# Patient Record
Sex: FEMALE | Race: WHITE | NOT HISPANIC OR LATINO | Employment: OTHER | URBAN - METROPOLITAN AREA
[De-identification: names, ages, dates, MRNs, and addresses within clinical notes are randomized per-mention and may not be internally consistent; named-entity substitution may affect disease eponyms.]

---

## 2023-03-16 ENCOUNTER — TELEPHONE (OUTPATIENT)
Dept: HEMATOLOGY/ONCOLOGY | Facility: CLINIC | Age: 68
End: 2023-03-16
Payer: MEDICARE

## 2023-03-24 ENCOUNTER — TELEPHONE (OUTPATIENT)
Dept: HEMATOLOGY/ONCOLOGY | Facility: CLINIC | Age: 68
End: 2023-03-24
Payer: MEDICARE

## 2023-03-24 NOTE — NURSING
3/30/-Tytyrizz Dr. Kaur's note and scanned into Media        2nd request sent for Dr. Kaur's office note.

## 2023-03-30 ENCOUNTER — OFFICE VISIT (OUTPATIENT)
Dept: HEMATOLOGY/ONCOLOGY | Facility: CLINIC | Age: 68
End: 2023-03-30
Payer: MEDICARE

## 2023-03-30 VITALS
HEART RATE: 80 BPM | TEMPERATURE: 98 F | BODY MASS INDEX: 22.36 KG/M2 | HEIGHT: 69 IN | RESPIRATION RATE: 18 BRPM | WEIGHT: 151 LBS | OXYGEN SATURATION: 97 % | SYSTOLIC BLOOD PRESSURE: 130 MMHG | DIASTOLIC BLOOD PRESSURE: 79 MMHG

## 2023-03-30 DIAGNOSIS — E04.1 THYROID NODULE: ICD-10-CM

## 2023-03-30 DIAGNOSIS — E04.1 THYROID NODULE: Primary | ICD-10-CM

## 2023-03-30 DIAGNOSIS — Z85.038 HISTORY OF COLON CANCER: ICD-10-CM

## 2023-03-30 PROCEDURE — 1101F PT FALLS ASSESS-DOCD LE1/YR: CPT | Mod: CPTII,S$GLB,, | Performed by: OTOLARYNGOLOGY

## 2023-03-30 PROCEDURE — 3008F PR BODY MASS INDEX (BMI) DOCUMENTED: ICD-10-PCS | Mod: CPTII,S$GLB,, | Performed by: OTOLARYNGOLOGY

## 2023-03-30 PROCEDURE — 1159F MED LIST DOCD IN RCRD: CPT | Mod: CPTII,S$GLB,, | Performed by: OTOLARYNGOLOGY

## 2023-03-30 PROCEDURE — 3078F PR MOST RECENT DIASTOLIC BLOOD PRESSURE < 80 MM HG: ICD-10-PCS | Mod: CPTII,S$GLB,, | Performed by: OTOLARYNGOLOGY

## 2023-03-30 PROCEDURE — 1126F AMNT PAIN NOTED NONE PRSNT: CPT | Mod: CPTII,S$GLB,, | Performed by: OTOLARYNGOLOGY

## 2023-03-30 PROCEDURE — 3075F SYST BP GE 130 - 139MM HG: CPT | Mod: CPTII,S$GLB,, | Performed by: OTOLARYNGOLOGY

## 2023-03-30 PROCEDURE — 99205 OFFICE O/P NEW HI 60 MIN: CPT | Mod: 25,S$GLB,, | Performed by: OTOLARYNGOLOGY

## 2023-03-30 PROCEDURE — 1160F RVW MEDS BY RX/DR IN RCRD: CPT | Mod: CPTII,S$GLB,, | Performed by: OTOLARYNGOLOGY

## 2023-03-30 PROCEDURE — 31575 PR LARYNGOSCOPY, FLEXIBLE; DIAGNOSTIC: ICD-10-PCS | Mod: S$GLB,,, | Performed by: OTOLARYNGOLOGY

## 2023-03-30 PROCEDURE — 3288F FALL RISK ASSESSMENT DOCD: CPT | Mod: CPTII,S$GLB,, | Performed by: OTOLARYNGOLOGY

## 2023-03-30 PROCEDURE — 3051F PR MOST RECENT HEMOGLOBIN A1C LEVEL 7.0 - < 8.0%: ICD-10-PCS | Mod: CPTII,S$GLB,, | Performed by: OTOLARYNGOLOGY

## 2023-03-30 PROCEDURE — 3008F BODY MASS INDEX DOCD: CPT | Mod: CPTII,S$GLB,, | Performed by: OTOLARYNGOLOGY

## 2023-03-30 PROCEDURE — 3075F PR MOST RECENT SYSTOLIC BLOOD PRESS GE 130-139MM HG: ICD-10-PCS | Mod: CPTII,S$GLB,, | Performed by: OTOLARYNGOLOGY

## 2023-03-30 PROCEDURE — 3051F HG A1C>EQUAL 7.0%<8.0%: CPT | Mod: CPTII,S$GLB,, | Performed by: OTOLARYNGOLOGY

## 2023-03-30 PROCEDURE — 1160F PR REVIEW ALL MEDS BY PRESCRIBER/CLIN PHARMACIST DOCUMENTED: ICD-10-PCS | Mod: CPTII,S$GLB,, | Performed by: OTOLARYNGOLOGY

## 2023-03-30 PROCEDURE — 31575 DIAGNOSTIC LARYNGOSCOPY: CPT | Mod: S$GLB,,, | Performed by: OTOLARYNGOLOGY

## 2023-03-30 PROCEDURE — 4010F ACE/ARB THERAPY RXD/TAKEN: CPT | Mod: CPTII,S$GLB,, | Performed by: OTOLARYNGOLOGY

## 2023-03-30 PROCEDURE — 99999 PR PBB SHADOW E&M-EST. PATIENT-LVL V: CPT | Mod: PBBFAC,,, | Performed by: OTOLARYNGOLOGY

## 2023-03-30 PROCEDURE — 1101F PR PT FALLS ASSESS DOC 0-1 FALLS W/OUT INJ PAST YR: ICD-10-PCS | Mod: CPTII,S$GLB,, | Performed by: OTOLARYNGOLOGY

## 2023-03-30 PROCEDURE — 4010F PR ACE/ARB THEARPY RXD/TAKEN: ICD-10-PCS | Mod: CPTII,S$GLB,, | Performed by: OTOLARYNGOLOGY

## 2023-03-30 PROCEDURE — 1126F PR PAIN SEVERITY QUANTIFIED, NO PAIN PRESENT: ICD-10-PCS | Mod: CPTII,S$GLB,, | Performed by: OTOLARYNGOLOGY

## 2023-03-30 PROCEDURE — 1159F PR MEDICATION LIST DOCUMENTED IN MEDICAL RECORD: ICD-10-PCS | Mod: CPTII,S$GLB,, | Performed by: OTOLARYNGOLOGY

## 2023-03-30 PROCEDURE — 99205 PR OFFICE/OUTPT VISIT, NEW, LEVL V, 60-74 MIN: ICD-10-PCS | Mod: 25,S$GLB,, | Performed by: OTOLARYNGOLOGY

## 2023-03-30 PROCEDURE — 99999 PR PBB SHADOW E&M-EST. PATIENT-LVL V: ICD-10-PCS | Mod: PBBFAC,,, | Performed by: OTOLARYNGOLOGY

## 2023-03-30 PROCEDURE — 3288F PR FALLS RISK ASSESSMENT DOCUMENTED: ICD-10-PCS | Mod: CPTII,S$GLB,, | Performed by: OTOLARYNGOLOGY

## 2023-03-30 PROCEDURE — 3078F DIAST BP <80 MM HG: CPT | Mod: CPTII,S$GLB,, | Performed by: OTOLARYNGOLOGY

## 2023-03-30 NOTE — PROGRESS NOTES
Date of Encounter: 3/30/2023  Provider: Carla Woo MD  Referring MD: Melody Kaur MD  PCP: Ana Schwarz MD  Med Onc: Melody Kaur MD  [place]-rectal surgeon: Kaitlynn Syed MD    CC: thyroid nodule (FLUS); hx of colon cancer    HPI:    Patient is a 67-year-old female with a history of colon cancer who was referred for evaluation and treatment of a thyroid nodule, FNA consistent with a follicular lesion of undetermined significance.  Patient has a hx of hypothyroidism and has taken synthroid for about the last  10 years  The thyroid mass was an incidental finding.  She denies a history of ionizing radiation to the head neck, family history of thyroid, dysphonia, dyspnea and dysphagia.      ROS: see HPI  Constitutional: Negative for activity change and appetite change, weight loss.   Eyes: Negative for discharge, visual changes.   Respiratory: Negative for difficulty breathing and wheezing   Cardiovascular: Negative for chest pain.   Gastrointestinal: Negative for abdominal distention and abdominal pain.   Endocrine: Negative for cold intolerance and heat intolerance.   Genitourinary: Negative for dysuria.   Musculoskeletal: Negative for gait problem, muscle pain and joint swelling.   Skin: Negative for color change and pallor; negative for skin lesions.   Neurological: Negative for syncope and weakness; no numbness face.   Psychiatric/Behavioral: Negative for agitation and confusion; negative for depression.    Physical Exam:      Constitutional  General Appearance: well nourished, well-developed, alert, oriented, in no acute distress  Communication: ability, understanding, normal  Head and Face  Inspection: normocephalic, atraumatic, no scars, lesions or masses   Palpation: no stepoffs, sinus tenderness or masses  Parotid glands: no masses, stones, swelling or tenderness  Eyes  Ocular Motility / Alignment: normal alignment, motility, no proptosis, enophthalmus or nystagmus  Conjunctiva: not  injected  Eyelids: no hooding, lag, entropion, or ectropion  Ears  Hearing: speech reception thresholds grossly normal  External Ears: no auricle lesions, non-tender, mobile to palpation  Otoscopy:  Right Ear: no tympanic membrane lesions, perforations, or effusion, normal EAC  Left Ear: no tympanic membrane lesions, perforations, or effusion, normal EAC  Nose  External Nose: no lesions, tenderness, trauma or deformity  Intranasal Exam: no edema, erythema, discharge, mass or obstruction  Oral Cavity / Oropharynx  Lips: upper and lower lips pink and moist  Teeth: good dentition  Gingiva: healthy  Oral Mucosa: moist, no mucosal lesions  Floor of Mouth: normal, no lesions, salivary ducts patent  Tongue: moist, normal mobility, no lesions  Palate: soft and hard palates without lesions or ulcers  Oropharynx: tonsils and walls without erythema, exudate, base of tongue soft to palpation  Nasopharynx, Hypopharynx, and Larynx  Indirect: could not perform exam due to intolerance by patient  Neck  Inspection and Palpation: no erythema, induration, emphysema, tenderness or masses  Larynx and Trachea: normal position; normal crepitus  Thyroid: large left inferior thyroid nodule  Submandibular Glands: no masses or tenderness  Lymphatic:  Anterior, Posterior, Submandibular, Submental, Supraclavicular: no lymphadenopathy present  Chest / Respiratory  Chest: no stridor or retractions, normal effort and expansion  Cardiovascular:  Pulses: 2+ carotid pulses bilaterally  Auscultation: deferred  Neurological  Cranial Nerves: grossly intact  General: no focal deficits  Psychiatric  Orientation: oriented to time, place and person  Mood and Affect: no depression, anxiety or agitation  Extremities  Inspection: moves all extremities well  Donor site  Chest, Back, Abdomen, Arms, Legs: N/A    PROCEDURES:   -------------------- LARYNGOSCOPY / NASOPHARYNGOSCOPY -------------------------     Pre-op DX: thyroid nodule  Post-op DX:  same  Anesthesia: Topical Neosynephrine / Tetracaine  Indications: to look at larynx  Adverse Events: None        Procedure in Detail:     Flexible endoscopy with video was performed through the nasal passages. The nasal cavity, nasopharynx, oropharynx, hypopharynx and larynx were adequately visualized. The true vocal cords and arytenoids were examined during phonation and repose.        Operative Findings:     Nasal Cavity: Within normal limits  Nasopharynx: Within normal limits  Tongue Base: Within normal limits  Pharyngeal Walls: Within normal limits  Epiglottis / Aryepiglottic Folds: Within normal limits  Pyriform Sinus: Within normal limits  Vocal Cords: Within normal limits  Arytenoids: Within normal limits        Test results:  Labs:  Path:  LEFT THYROID, FINE NEEDLE ASPIRATION:     --FOLLICULAR LESION OF UNDETERMINED SIGNIFICANCE (BETHESDA CATEGORY III    OF VI).     Comment:     Dr. Isaiah Garcia has reviewed this case in intradepartmental    consultation and concurs with the above interpretation.       US: 12/2022  FINDINGS:  The right lobe of the thyroid measures 4.8 x 0.8 x 1.2 cm.  The left lobe measures 2.9 x 5.5 x 3.3 cm.  The thyroid isthmus measures 2 mm in thickness.  There is a well-circumscribed hyperechoic 3.0 x 3.7 x 3.2 cm nodule in the left lobe.  There are no concerning artifacts.  There is normal vascularity.     Impression:   Left-sided thyroid nodule as above which meets criteria for percutaneous sampling.  TI-RADS category 3.     Assessment:   3.7 cm left sided thyroid nodule; FNA follicular lesion of unknown significance  History of colon cancer    Plan:   Due to the fact that the patient is over 55 years of age, the nodule is 3.7 cm in the biopsy is a follicular lesion of unknown significance, I recommend a left thyroid lobectomy with possible total thyroidectomy pending pathology results.  Risks, benefits and alternatives were discussed with the patient, questions were answered and  the consent was signed.  Risks include but are not limited to bleeding, scarring, infection, hematoma, seroma, numbness of the skin of the neck for 6-12 months, unsatisfactory cosmetic result, injury to 1 or both of the vocal cord nerves with temporary or permanent hoarseness, dysphagia need for tracheotomy, temporary or permanent hypoparathyroidism with need for calcium and vitamin-D supplementation and need for further treatment

## 2023-04-25 PROBLEM — Z85.038 HISTORY OF COLON CANCER: Status: ACTIVE | Noted: 2023-04-25

## 2023-04-25 PROBLEM — E04.1 THYROID NODULE: Status: ACTIVE | Noted: 2023-04-25

## 2023-04-26 DIAGNOSIS — G89.18 POST-OP PAIN: Primary | ICD-10-CM

## 2023-04-26 RX ORDER — HYDROCODONE BITARTRATE AND ACETAMINOPHEN 10; 325 MG/1; MG/1
1 TABLET ORAL EVERY 6 HOURS PRN
Qty: 28 TABLET | Refills: 0 | Status: SHIPPED | OUTPATIENT
Start: 2023-04-26 | End: 2023-05-02

## 2023-05-01 NOTE — PROGRESS NOTES
"Date of Encounter: 3/30/2023  Provider: Carla Woo MD  Referring MD: Melody Kaur MD  PCP: Ana Schwarz MD  Med Onc: Melody Kaur MD  [place]-rectal surgeon: Kaitlynn Syed MD    CC: thyroid nodule (FLUS); hx of colon cancer    HPI:    Patient is a 67-year-old female with a history of colon cancer who was referred for evaluation and treatment of a thyroid nodule, FNA consistent with a follicular lesion of undetermined significance.  Patient has a hx of hypothyroidism and has taken synthroid for about the last  10 years  The thyroid mass was an incidental finding.  She denies a history of ionizing radiation to the head neck, family history of thyroid, dysphonia, dyspnea and dysphagia.    5/2/2023  Patient is here today status post left thyroid lobectomy.  Tolerating a regular diet  Sore throat getting gradually better.    ROS: see HPI  Constitutional: Negative for activity change and appetite change, weight loss.   Eyes: Negative for discharge, visual changes.   Respiratory: Negative for difficulty breathing and wheezing   Cardiovascular: Negative for chest pain.   Gastrointestinal: Negative for abdominal distention and abdominal pain.   Endocrine: Negative for cold intolerance and heat intolerance.   Genitourinary: Negative for dysuria.   Musculoskeletal: Negative for gait problem, muscle pain and joint swelling.   Skin: Negative for color change and pallor; negative for skin lesions.   Neurological: Negative for syncope and weakness; no numbness face.   Psychiatric/Behavioral: Negative for agitation and confusion; negative for depression.    Physical Exam:      Constitutional  General Appearance: well nourished, well-developed, alert, oriented, in no acute distress  Communication: ability, understanding, normal; voice normal; no stridor  Neck  Inspection and Palpation: incision intact; no hematoma/seroma      FINAL DIAGNOSIS:     1. SPECIMEN SUBMITTED AS "LYMPH NODE," RIGHT LEVEL 6, EXCISION:       " --BENIGN FIBROADIPOSE TISSUE.       --NO LYMPH NODE IDENTIFIED.     2. THYROID GLAND, LEFT LOBE, LOBECTOMY:   --ENCAPSULATED ANGIOINVASIVE FOLLICULAR THYROID CARCINOMA (4.7  CENTIMETERS).       --MARGINS ARE NEGATIVE FOR MALIGNANCY.       --PATCHY MINIMAL-TO-MILD LYMPHOCYTIC THYROIDITIS.   --ONE INCIDENTAL PERITHYROIDAL LYMPH NODE WITH NO MORPHOLOGIC EVIDENCE  OF MALIGNANCY (0/1).     Path: FNA  LEFT THYROID, FINE NEEDLE ASPIRATION:     --FOLLICULAR LESION OF UNDETERMINED SIGNIFICANCE (BETHESDA CATEGORY III    OF VI).     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 Will also scuss at Tumor Board Thursday  Refer to Endocrinology  Risks include but are not limited to bleeding, scarring, infection, hematoma, seroma, numbness of the skin of the neck for 6-12 months, unsatisfactory cosmetic result, injury to right vocal cord nerve with temporary or permanent hoarseness, dysphagia, temporary or permanent hypoparathyroidism with need for calcium and vitamin-D supplementation and need for further treatment

## 2023-05-02 ENCOUNTER — OFFICE VISIT (OUTPATIENT)
Dept: HEMATOLOGY/ONCOLOGY | Facility: CLINIC | Age: 68
End: 2023-05-02
Payer: MEDICARE

## 2023-05-02 VITALS
DIASTOLIC BLOOD PRESSURE: 76 MMHG | BODY MASS INDEX: 21.74 KG/M2 | HEART RATE: 75 BPM | RESPIRATION RATE: 18 BRPM | TEMPERATURE: 98 F | WEIGHT: 146.81 LBS | HEIGHT: 69 IN | OXYGEN SATURATION: 95 % | SYSTOLIC BLOOD PRESSURE: 135 MMHG

## 2023-05-02 DIAGNOSIS — C73 THYROID CANCER: ICD-10-CM

## 2023-05-02 PROCEDURE — 3008F PR BODY MASS INDEX (BMI) DOCUMENTED: ICD-10-PCS | Mod: CPTII,S$GLB,, | Performed by: OTOLARYNGOLOGY

## 2023-05-02 PROCEDURE — 1126F AMNT PAIN NOTED NONE PRSNT: CPT | Mod: CPTII,S$GLB,, | Performed by: OTOLARYNGOLOGY

## 2023-05-02 PROCEDURE — 99024 PR POST-OP FOLLOW-UP VISIT: ICD-10-PCS | Mod: S$GLB,,, | Performed by: OTOLARYNGOLOGY

## 2023-05-02 PROCEDURE — 3075F SYST BP GE 130 - 139MM HG: CPT | Mod: CPTII,S$GLB,, | Performed by: OTOLARYNGOLOGY

## 2023-05-02 PROCEDURE — 1126F PR PAIN SEVERITY QUANTIFIED, NO PAIN PRESENT: ICD-10-PCS | Mod: CPTII,S$GLB,, | Performed by: OTOLARYNGOLOGY

## 2023-05-02 PROCEDURE — 1159F MED LIST DOCD IN RCRD: CPT | Mod: CPTII,S$GLB,, | Performed by: OTOLARYNGOLOGY

## 2023-05-02 PROCEDURE — 3288F FALL RISK ASSESSMENT DOCD: CPT | Mod: CPTII,S$GLB,, | Performed by: OTOLARYNGOLOGY

## 2023-05-02 PROCEDURE — 3075F PR MOST RECENT SYSTOLIC BLOOD PRESS GE 130-139MM HG: ICD-10-PCS | Mod: CPTII,S$GLB,, | Performed by: OTOLARYNGOLOGY

## 2023-05-02 PROCEDURE — 3008F BODY MASS INDEX DOCD: CPT | Mod: CPTII,S$GLB,, | Performed by: OTOLARYNGOLOGY

## 2023-05-02 PROCEDURE — 99024 POSTOP FOLLOW-UP VISIT: CPT | Mod: S$GLB,,, | Performed by: OTOLARYNGOLOGY

## 2023-05-02 PROCEDURE — 4010F ACE/ARB THERAPY RXD/TAKEN: CPT | Mod: CPTII,S$GLB,, | Performed by: OTOLARYNGOLOGY

## 2023-05-02 PROCEDURE — 1160F RVW MEDS BY RX/DR IN RCRD: CPT | Mod: CPTII,S$GLB,, | Performed by: OTOLARYNGOLOGY

## 2023-05-02 PROCEDURE — 99999 PR PBB SHADOW E&M-EST. PATIENT-LVL V: ICD-10-PCS | Mod: PBBFAC,,, | Performed by: OTOLARYNGOLOGY

## 2023-05-02 PROCEDURE — 3078F DIAST BP <80 MM HG: CPT | Mod: CPTII,S$GLB,, | Performed by: OTOLARYNGOLOGY

## 2023-05-02 PROCEDURE — 3051F PR MOST RECENT HEMOGLOBIN A1C LEVEL 7.0 - < 8.0%: ICD-10-PCS | Mod: CPTII,S$GLB,, | Performed by: OTOLARYNGOLOGY

## 2023-05-02 PROCEDURE — 4010F PR ACE/ARB THEARPY RXD/TAKEN: ICD-10-PCS | Mod: CPTII,S$GLB,, | Performed by: OTOLARYNGOLOGY

## 2023-05-02 PROCEDURE — 1101F PT FALLS ASSESS-DOCD LE1/YR: CPT | Mod: CPTII,S$GLB,, | Performed by: OTOLARYNGOLOGY

## 2023-05-02 PROCEDURE — 1101F PR PT FALLS ASSESS DOC 0-1 FALLS W/OUT INJ PAST YR: ICD-10-PCS | Mod: CPTII,S$GLB,, | Performed by: OTOLARYNGOLOGY

## 2023-05-02 PROCEDURE — 1160F PR REVIEW ALL MEDS BY PRESCRIBER/CLIN PHARMACIST DOCUMENTED: ICD-10-PCS | Mod: CPTII,S$GLB,, | Performed by: OTOLARYNGOLOGY

## 2023-05-02 PROCEDURE — 1159F PR MEDICATION LIST DOCUMENTED IN MEDICAL RECORD: ICD-10-PCS | Mod: CPTII,S$GLB,, | Performed by: OTOLARYNGOLOGY

## 2023-05-02 PROCEDURE — 3078F PR MOST RECENT DIASTOLIC BLOOD PRESSURE < 80 MM HG: ICD-10-PCS | Mod: CPTII,S$GLB,, | Performed by: OTOLARYNGOLOGY

## 2023-05-02 PROCEDURE — 3051F HG A1C>EQUAL 7.0%<8.0%: CPT | Mod: CPTII,S$GLB,, | Performed by: OTOLARYNGOLOGY

## 2023-05-02 PROCEDURE — 99999 PR PBB SHADOW E&M-EST. PATIENT-LVL V: CPT | Mod: PBBFAC,,, | Performed by: OTOLARYNGOLOGY

## 2023-05-02 PROCEDURE — 3288F PR FALLS RISK ASSESSMENT DOCUMENTED: ICD-10-PCS | Mod: CPTII,S$GLB,, | Performed by: OTOLARYNGOLOGY

## 2023-05-04 ENCOUNTER — TUMOR BOARD CONFERENCE (OUTPATIENT)
Dept: OTOLARYNGOLOGY | Facility: CLINIC | Age: 68
End: 2023-05-04
Payer: MEDICARE

## 2023-05-04 NOTE — PROGRESS NOTES
Presenting Hospital / Clinic: [facility]  Virtual Tumor Board Conference: Virtual  Presenter: Dr. Woo  Date Presented to Tumor Board: 05/04/23  Specialties Present: Medical Oncology; Radiation Oncology; Pathology; Navigation; Radiology; Head and Neck; Speech Pathology  Presentation at Cancer Conference: Prospective  Cancer Type: Head and neck cancer

## 2023-05-24 ENCOUNTER — TELEPHONE (OUTPATIENT)
Dept: HEMATOLOGY/ONCOLOGY | Facility: CLINIC | Age: 68
End: 2023-05-24
Payer: MEDICARE

## 2023-05-24 DIAGNOSIS — Z98.890 POST-OPERATIVE NAUSEA AND VOMITING: Primary | ICD-10-CM

## 2023-05-24 DIAGNOSIS — R11.2 POST-OPERATIVE NAUSEA AND VOMITING: Primary | ICD-10-CM

## 2023-05-24 DIAGNOSIS — G89.18 POST-OP PAIN: ICD-10-CM

## 2023-05-24 RX ORDER — HYDROCODONE BITARTRATE AND ACETAMINOPHEN 7.5; 325 MG/1; MG/1
1 TABLET ORAL EVERY 6 HOURS PRN
Qty: 28 TABLET | Refills: 0 | Status: SHIPPED | OUTPATIENT
Start: 2023-05-24 | End: 2023-05-31

## 2023-05-24 RX ORDER — PROMETHAZINE HYDROCHLORIDE 25 MG/1
25 TABLET ORAL EVERY 6 HOURS PRN
Qty: 12 TABLET | Refills: 0 | Status: SHIPPED | OUTPATIENT
Start: 2023-05-24 | End: 2023-05-28

## 2023-05-24 NOTE — TELEPHONE ENCOUNTER
Left message for patient to call back to discuss these instructions.    ----- Message from Carla Woo MD sent at 5/24/2023 11:38 AM CDT -----  Hi  Please call patient and let her know that I would like her to start taking TUMS to prevent low calcium--it must be TUMS to be absorbed properly  Take two 3 times a day.  If she experienced numbness and tingling around her mouth or hand spasms to call office  Thanks

## 2023-05-24 NOTE — TELEPHONE ENCOUNTER
Patient advised of below message and verbalized understanding of all. Will call us if any issues arise.       ----- Message from Carla Woo MD sent at 5/24/2023 11:38 AM CDT -----  Hi  Please call patient and let her know that I would like her to start taking TUMS to prevent low calcium--it must be TUMS to be absorbed properly  Take two 3 times a day.  If she experienced numbness and tingling around her mouth or hand spasms to call office  Thanks

## 2023-05-30 ENCOUNTER — OFFICE VISIT (OUTPATIENT)
Dept: HEMATOLOGY/ONCOLOGY | Facility: CLINIC | Age: 68
End: 2023-05-30
Payer: MEDICARE

## 2023-05-30 VITALS
WEIGHT: 149.69 LBS | OXYGEN SATURATION: 96 % | RESPIRATION RATE: 16 BRPM | SYSTOLIC BLOOD PRESSURE: 130 MMHG | DIASTOLIC BLOOD PRESSURE: 70 MMHG | TEMPERATURE: 98 F | BODY MASS INDEX: 22.17 KG/M2 | HEIGHT: 69 IN | HEART RATE: 65 BPM

## 2023-05-30 DIAGNOSIS — C73 THYROID CANCER: Primary | ICD-10-CM

## 2023-05-30 PROCEDURE — 3075F SYST BP GE 130 - 139MM HG: CPT | Mod: CPTII,S$GLB,, | Performed by: OTOLARYNGOLOGY

## 2023-05-30 PROCEDURE — 3078F DIAST BP <80 MM HG: CPT | Mod: CPTII,S$GLB,, | Performed by: OTOLARYNGOLOGY

## 2023-05-30 PROCEDURE — 99999 PR PBB SHADOW E&M-EST. PATIENT-LVL IV: CPT | Mod: PBBFAC,,, | Performed by: OTOLARYNGOLOGY

## 2023-05-30 PROCEDURE — 3078F PR MOST RECENT DIASTOLIC BLOOD PRESSURE < 80 MM HG: ICD-10-PCS | Mod: CPTII,S$GLB,, | Performed by: OTOLARYNGOLOGY

## 2023-05-30 PROCEDURE — 3008F PR BODY MASS INDEX (BMI) DOCUMENTED: ICD-10-PCS | Mod: CPTII,S$GLB,, | Performed by: OTOLARYNGOLOGY

## 2023-05-30 PROCEDURE — 3008F BODY MASS INDEX DOCD: CPT | Mod: CPTII,S$GLB,, | Performed by: OTOLARYNGOLOGY

## 2023-05-30 PROCEDURE — 99024 PR POST-OP FOLLOW-UP VISIT: ICD-10-PCS | Mod: S$GLB,,, | Performed by: OTOLARYNGOLOGY

## 2023-05-30 PROCEDURE — 3051F HG A1C>EQUAL 7.0%<8.0%: CPT | Mod: CPTII,S$GLB,, | Performed by: OTOLARYNGOLOGY

## 2023-05-30 PROCEDURE — 3288F PR FALLS RISK ASSESSMENT DOCUMENTED: ICD-10-PCS | Mod: CPTII,S$GLB,, | Performed by: OTOLARYNGOLOGY

## 2023-05-30 PROCEDURE — 1101F PT FALLS ASSESS-DOCD LE1/YR: CPT | Mod: CPTII,S$GLB,, | Performed by: OTOLARYNGOLOGY

## 2023-05-30 PROCEDURE — 3051F PR MOST RECENT HEMOGLOBIN A1C LEVEL 7.0 - < 8.0%: ICD-10-PCS | Mod: CPTII,S$GLB,, | Performed by: OTOLARYNGOLOGY

## 2023-05-30 PROCEDURE — 1126F PR PAIN SEVERITY QUANTIFIED, NO PAIN PRESENT: ICD-10-PCS | Mod: CPTII,S$GLB,, | Performed by: OTOLARYNGOLOGY

## 2023-05-30 PROCEDURE — 4010F PR ACE/ARB THEARPY RXD/TAKEN: ICD-10-PCS | Mod: CPTII,S$GLB,, | Performed by: OTOLARYNGOLOGY

## 2023-05-30 PROCEDURE — 1126F AMNT PAIN NOTED NONE PRSNT: CPT | Mod: CPTII,S$GLB,, | Performed by: OTOLARYNGOLOGY

## 2023-05-30 PROCEDURE — 1101F PR PT FALLS ASSESS DOC 0-1 FALLS W/OUT INJ PAST YR: ICD-10-PCS | Mod: CPTII,S$GLB,, | Performed by: OTOLARYNGOLOGY

## 2023-05-30 PROCEDURE — 99999 PR PBB SHADOW E&M-EST. PATIENT-LVL IV: ICD-10-PCS | Mod: PBBFAC,,, | Performed by: OTOLARYNGOLOGY

## 2023-05-30 PROCEDURE — 3288F FALL RISK ASSESSMENT DOCD: CPT | Mod: CPTII,S$GLB,, | Performed by: OTOLARYNGOLOGY

## 2023-05-30 PROCEDURE — 4010F ACE/ARB THERAPY RXD/TAKEN: CPT | Mod: CPTII,S$GLB,, | Performed by: OTOLARYNGOLOGY

## 2023-05-30 PROCEDURE — 3075F PR MOST RECENT SYSTOLIC BLOOD PRESS GE 130-139MM HG: ICD-10-PCS | Mod: CPTII,S$GLB,, | Performed by: OTOLARYNGOLOGY

## 2023-05-30 PROCEDURE — 99024 POSTOP FOLLOW-UP VISIT: CPT | Mod: S$GLB,,, | Performed by: OTOLARYNGOLOGY

## 2023-05-30 NOTE — PATIENT INSTRUCTIONS
Acquaphor 3 times a day  In 2 weeks start Scar Away--follow packet insert  In 4 weeks, start scar massage

## 2023-05-30 NOTE — PROGRESS NOTES
Date of Encounter: 3/30/2023  Provider: Carla Woo MD  Referring MD: Melody Kaur MD  PCP: Ana Schwarz MD  Med Onc: Melody Kaur MD  [place]-rectal surgeon: Kaitlynn Syed MD    CC: thyroid nodule (FLUS); hx of colon cancer    HPI:    Patient is a 67-year-old female with a history of colon cancer who was referred for evaluation and treatment of a thyroid nodule, FNA consistent with a follicular lesion of undetermined significance.  Patient has a hx of hypothyroidism and has taken synthroid for about the last  10 years  The thyroid mass was an incidental finding.  She denies a history of ionizing radiation to the head neck, family history of thyroid, dysphonia, dyspnea and dysphagia.    5/2/2023  Patient is here today status post left thyroid lobectomy.  Tolerating a regular diet  Sore throat getting gradually better.    5/30/2023  Patient presents today status post completion thyroidectomy.  She C/O tenderness along incision and tightness in her neck    ROS: see HPI  Constitutional: Negative for activity change and appetite change, weight loss.   Eyes: Negative for discharge, visual changes.   Respiratory: Negative for difficulty breathing and wheezing   Cardiovascular: Negative for chest pain.   Gastrointestinal: Negative for abdominal distention and abdominal pain.   Endocrine: Negative for cold intolerance and heat intolerance.   Genitourinary: Negative for dysuria.   Musculoskeletal: Negative for gait problem, muscle pain and joint swelling.   Skin: Negative for color change and pallor; negative for skin lesions.   Neurological: Negative for syncope and weakness; no numbness face.   Psychiatric/Behavioral: Negative for agitation and confusion; negative for depression.    Physical Exam:      Constitutional  General Appearance: well nourished, well-developed, alert, oriented, in no acute distress  Communication: ability, understanding, normal; voice normal; no stridor  Neck  Inspection and Palpation:  "incision intact; superior flap edema; no hematoma/seroma    PATH  Completion thyroidectomy  THYROID GLAND, RIGHT LOBE, LOBECTOMY:   --PERITHYROIDAL FIBROSIS AND CHRONIC INFLAMMATION WITH MULTINUCLEATE    GIANT CELL REACTION, COMPATIBLE WITH PREVIOUS PROCEDURE SITE CHANGES.       FINAL DIAGNOSIS:   1. SPECIMEN SUBMITTED AS "LYMPH NODE," RIGHT LEVEL 6, EXCISION:       --BENIGN FIBROADIPOSE TISSUE.       --NO LYMPH NODE IDENTIFIED.     2. THYROID GLAND, LEFT LOBE, LOBECTOMY:   --ENCAPSULATED ANGIOINVASIVE FOLLICULAR THYROID CARCINOMA (4.7  CENTIMETERS).       --MARGINS ARE NEGATIVE FOR MALIGNANCY.       --PATCHY MINIMAL-TO-MILD LYMPHOCYTIC THYROIDITIS.   --ONE INCIDENTAL PERITHYROIDAL LYMPH NODE WITH NO MORPHOLOGIC EVIDENCE  OF MALIGNANCY (0/1).     Path: FNA  LEFT THYROID, FINE NEEDLE ASPIRATION:     --FOLLICULAR LESION OF UNDETERMINED SIGNIFICANCE (BETHESDA CATEGORY III    OF VI).     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

## 2023-05-31 ENCOUNTER — TELEPHONE (OUTPATIENT)
Dept: ENDOCRINOLOGY | Facility: CLINIC | Age: 68
End: 2023-05-31
Payer: MEDICARE

## 2023-05-31 ENCOUNTER — TELEPHONE (OUTPATIENT)
Dept: HEMATOLOGY/ONCOLOGY | Facility: CLINIC | Age: 68
End: 2023-05-31
Payer: MEDICARE

## 2023-05-31 NOTE — TELEPHONE ENCOUNTER
"----- Message from Rozina Marques MA sent at 5/31/2023  9:52 AM CDT -----  Patient is scheduled please call patient and let them know thank you.    ----- Message -----  From: Andrew Page MD  Sent: 5/31/2023   7:54 AM CDT  To: Rozina Marques MA    She doesn't need an ultrasound yet. She just had surgery. She will need labs but it will be too early to order them. I usually wait until the patient is 4 weeks post-op to do labs which will be 1 week after she sees me.   ----- Message -----  From: Rozina Marques MA  Sent: 5/31/2023   7:48 AM CDT  To: Andrew Page MD    I need the u/s apt to be put in for here not radiology dept than you   ----- Message -----  From: Andrew Page MD  Sent: 5/30/2023  10:48 AM CDT  To: Rozina Marques MA    Can you get this patient in with one of us as a new patient visit.   ----- Message -----  From: Carla Woo MD  Sent: 5/30/2023  10:37 AM CDT  To: Andrew Page MD, Angela Burks MD, #    HI  I am referring this patient to you all, whoever can see her first  Path below  Referral in [facility]  Thanks  Carla WICK  Completion thyroidectomy  THYROID GLAND, RIGHT LOBE, LOBECTOMY:   --PERITHYROIDAL FIBROSIS AND CHRONIC INFLAMMATION WITH MULTINUCLEATE    GIANT CELL REACTION, COMPATIBLE WITH PREVIOUS PROCEDURE SITE CHANGES.       FINAL DIAGNOSIS:   1. SPECIMEN SUBMITTED AS "LYMPH NODE," RIGHT LEVEL 6, EXCISION:       --BENIGN FIBROADIPOSE TISSUE.       --NO LYMPH NODE IDENTIFIED.     2. THYROID GLAND, LEFT LOBE, LOBECTOMY:   --ENCAPSULATED ANGIOINVASIVE FOLLICULAR THYROID CARCINOMA (4.7  CENTIMETERS).       --MARGINS ARE NEGATIVE FOR MALIGNANCY.       --PATCHY MINIMAL-TO-MILD LYMPHOCYTIC THYROIDITIS.   --ONE INCIDENTAL PERITHYROIDAL LYMPH NODE WITH NO MORPHOLOGIC EVIDENCE  OF MALIGNANCY (0/1).               "

## 2023-05-31 NOTE — TELEPHONE ENCOUNTER
Siddharth for patient stating that we have her all schedule up as a np with Dr. Page on 6/12 for her thyroid

## 2023-06-06 ENCOUNTER — PATIENT MESSAGE (OUTPATIENT)
Dept: DERMATOLOGY | Facility: CLINIC | Age: 68
End: 2023-06-06
Payer: MEDICARE